# Patient Record
Sex: FEMALE | Race: WHITE | NOT HISPANIC OR LATINO | Employment: OTHER | URBAN - METROPOLITAN AREA
[De-identification: names, ages, dates, MRNs, and addresses within clinical notes are randomized per-mention and may not be internally consistent; named-entity substitution may affect disease eponyms.]

---

## 2024-06-13 DIAGNOSIS — M79.671 FOOT PAIN, BILATERAL: Primary | ICD-10-CM

## 2024-06-13 DIAGNOSIS — M79.672 FOOT PAIN, BILATERAL: Primary | ICD-10-CM

## 2024-06-17 ENCOUNTER — HOSPITAL ENCOUNTER (OUTPATIENT)
Dept: RADIOLOGY | Facility: HOSPITAL | Age: 64
Discharge: HOME | End: 2024-06-17
Attending: PODIATRIST
Payer: COMMERCIAL

## 2024-06-17 ENCOUNTER — OFFICE VISIT (OUTPATIENT)
Dept: PODIATRY | Facility: CLINIC | Age: 64
End: 2024-06-17
Payer: COMMERCIAL

## 2024-06-17 DIAGNOSIS — M79.671 FOOT PAIN, BILATERAL: ICD-10-CM

## 2024-06-17 DIAGNOSIS — M79.672 FOOT PAIN, BILATERAL: ICD-10-CM

## 2024-06-17 DIAGNOSIS — M21.611 BUNION OF GREAT TOE OF RIGHT FOOT: Primary | ICD-10-CM

## 2024-06-17 DIAGNOSIS — M20.5X9 MEDIAL CROSSOVER TOE DEFORMITY: ICD-10-CM

## 2024-06-17 DIAGNOSIS — M20.41 HAMMERTOE OF SECOND TOE OF RIGHT FOOT: ICD-10-CM

## 2024-06-17 PROCEDURE — 73630 X-RAY EXAM OF FOOT: CPT | Mod: TC,50

## 2024-06-17 PROCEDURE — 1160F RVW MEDS BY RX/DR IN RCRD: CPT | Mod: CPTII,S$GLB,, | Performed by: PODIATRIST

## 2024-06-17 PROCEDURE — 99204 OFFICE O/P NEW MOD 45 MIN: CPT | Mod: S$GLB,,, | Performed by: PODIATRIST

## 2024-06-17 PROCEDURE — 1159F MED LIST DOCD IN RCRD: CPT | Mod: CPTII,S$GLB,, | Performed by: PODIATRIST

## 2024-06-17 PROCEDURE — 99999 PR PBB SHADOW E&M-EST. PATIENT-LVL IV: CPT | Mod: PBBFAC,,, | Performed by: PODIATRIST

## 2024-06-17 PROCEDURE — 4010F ACE/ARB THERAPY RXD/TAKEN: CPT | Mod: CPTII,S$GLB,, | Performed by: PODIATRIST

## 2024-06-17 RX ORDER — TIRZEPATIDE 2.5 MG/.5ML
2.5 INJECTION, SOLUTION SUBCUTANEOUS WEEKLY
COMMUNITY
Start: 2024-06-14

## 2024-06-17 NOTE — PROGRESS NOTES
Subjective:       Patient ID: Roxy Ac is a 63 y.o. female.    Chief Complaint: Foot Problem (Patient denies pain at present to left foot. Patient is nondiabetic. Patients states has a bunion and 2nd toe crosses over on great left toe)      HPI: Roxy Ac presents to the office today for evaluation bunion deformity present to the right great toe.  States this is causing pain and discomfort.  Patient also noticing medial deviation of the 2nd toe causing discomfort with ambulation.  She is sought treatment prior to a physician [place].   Has utilize multiple modalities of conservative therapy without significant improvement. Patient has had discomfort to the great toe for the past several months to years. Patient is indeed interested in surgical intervention and/or cure for alleviation of symptoms. Patient's Primary Care Provider is Vance Arreaga MD.     Review of patient's allergies indicates:  No Known Allergies    Past Medical History:   Diagnosis Date    Arthritis     Hypertension        Family History   Problem Relation Name Age of Onset    Diabetes Maternal Grandmother      Prostate cancer Father      Alzheimer's disease Father      Hypertension Father      Hypertension Mother      Prostate cancer Brother      Breast cancer Paternal Aunt  78       Social History     Socioeconomic History    Marital status:    Occupational History    Occupation: Retired   Tobacco Use    Smoking status: Never    Smokeless tobacco: Never   Substance and Sexual Activity    Alcohol use: Yes     Comment: OCC.    Drug use: Never    Sexual activity: Yes     Partners: Male     Birth control/protection: None     Comment:      Social Determinants of Health     Financial Resource Strain: Low Risk  (1/3/2022)    [facility], [facility] Its Subsidiaries and Affiliates    Overall  Financial Resource Strain (CARDIA)     Difficulty of Paying Living Expenses: Not hard at all   Food Insecurity: No Food Insecurity (1/3/2022)    [facility], [facility] and [facility]ates    Hunger Vital Sign     Worried About Running Out of Food in the Last Year: Never true     Ran Out of Food in the Last Year: Never true   Transportation Needs: No Transportation Needs (1/3/2022)    [facility], [facility]    PRAPARE - Transportation     Lack of Transportation (Medical): No     Lack of Transportation (Non-Medical): No   Physical Activity: Inactive (1/3/2022)    [facility], [facility] and [facility]    Exercise Vital Sign     Days of Exercise per Week: 0 days     Minutes of Exercise per Session: 0 min   Stress: No Stress Concern Present (1/3/2022)    [facility], [facility]    [employer] - Occupational Stress Questionnaire     Feeling of Stress : Not at all   Housing Stability: Low Risk  (1/3/2022)    [facility], [facility]    Housing Stability Vital Sign     Unable to Pay for Housing in the Last Year: No     Number of Places Lived in the Last Year: 1     In the last 12 months, was there a time when you did not have a  steady place to sleep or slept in a shelter (including now)?: No       Past Surgical History:   Procedure Laterality Date    KNEE SURGERY         Review of Systems      Objective:   There were no vitals taken for this visit.    US Breast Left Limited, Mammo Digital Diagnostic Bilat with Pablo  Narrative: Result:   Mammo Digital Diagnostic Bilat with Pablo  US Breast Left Limited     History:  Patient is 63 y.o. and is seen for diagnostic imaging.    Films Compared:  Prior images (if available) were compared.     Findings:  This procedure was performed using tomosynthesis. Computer-aided detection   was utilized in the interpretation of this examination.  The breasts have scattered areas of fibroglandular density.     Mammo Digital Diagnostic Bilat with Pablo  There is no evidence of suspicious masses, calcifications, or other   abnormal findings.    US Breast Left Limited  There is no evidence of suspicious masses or other abnormal findings.  Impression: Bilateral  Mammo Digital Diagnostic Bilat with Pablo  There is no mammographic evidence of malignancy.    Left  US Breast Left Limited  There is no sonographic evidence of malignancy.    BI-RADS Category:   Overall: 1 - Negative       Recommendation:  Routine screening mammogram in 1 year is recommended.      Physical Exam   LOWER EXTREMITY PHYSICAL EXAMINATION    VASCULAR: On the right and left foot, the dorsalis pedis pulse is 2/4 and the posterior tibial pulse is 2/4. Capillary refill time is less than 3 seconds. Hair growth is present on the dorsum of the foot and at the digits. Proximal to distal temperature is warm to warm.    ORTHOPEDIC (right):  Moderate bunion deformity present to the right 1st metatarsophalangeal joint.  Moderate-sized medial eminence noted.  There is no significant arthritis with range of motion in dorsiflexion or plantar flexion.  No decrease in motion.  There is a medial deviation of the 2nd toe at the metatarsophalangeal  joint.    DERMATOLOGY: Skin is supple, dry and intact. No ecchymosis is noted. No ulcerations are noted. Skin is supple and moist.     NEUROLOGY: Protective sensation is intact via 5.07 Montegut Luis monofilament. Proprioception is intact. Sensation to light touch is intact.     Assessment:     1. Bunion of great toe of right foot    2. Hammertoe of second toe of right foot    3. Medial crossover toe deformity          Plan:     Bunion of great toe of right foot  -     Case Request Operating Room: BUNIONECTOMY, LAPIDUS, CORRECTION, HAMMER TOE, OSTEOTOMY, 2nd METATARSAL BONE  -     Ambulatory referral/consult to Pre-Admit; Future; Expected date: 06/24/2024    Hammertoe of second toe of right foot  -     Case Request Operating Room: BUNIONECTOMY, LAPIDUS, CORRECTION, HAMMER TOE, OSTEOTOMY, 2nd METATARSAL BONE  -     Ambulatory referral/consult to Pre-Admit; Future; Expected date: 06/24/2024    Medial crossover toe deformity  -     Case Request Operating Room: BUNIONECTOMY, LAPIDUS, CORRECTION, HAMMER TOE, OSTEOTOMY, 2nd METATARSAL BONE  -     Ambulatory referral/consult to Pre-Admit; Future; Expected date: 06/24/2024        Thorough discussion is had with the patient today, concerning the diagnosis, its etiology, and the treatment algorithm at present.    X-rays taken today and interpreted and reviewed by myself with the patient the room.  Hallux abductovalgus deformity with intermetatarsal angle of 21°.  The hallux abductus angle is 12°.  Tibial sesamoid position is 6.  No significant arthritis or joint space narrowing of the 1st metatarsophalangeal joint.  Deviation of the 2nd toe medially with evidence of hammertoe deformity at the proximal interphalangeal joint..     Discuss conservative and further surgical intervention.  Discussed 1st metatarsal cuneiform joint derotational arthrodesis to correct the bunion deformity, hammertoe correction via proximal interphalangeal joint arthrodesis, and Weil osteotomy with  repair of the lateral collateral ligament of the 2nd MPJ.     Discuss surgical expectations.  Discussed the postoperative protocol.  Patient will be nonweightbearing for proximally 3 weeks followed by an additional 3-4 weeks of weight-bearing in a postoperative boot.  Discussed risk of DVT.  Recommend to utilize aspirin for DVT prophylaxis.  Patient be nonweightbearing with knee scooter crutches    The procedure of (first tarsometatarsal joint arthrodesis of the right foot, right 2nd metatarsal shortening osteotomy-Weil, 2nd hammertoe proximal interphalangeal joint arthrodesis) was thoroughly explained to the patient. Its necessity and/or purpose and the implications therein were outlined, including any pertinent advantages and/or disadvantages, and possible complications, if any. Possible complications include recurrence of pathology and/or deformity, infection (cellulitis, drainage, purulence, malodor, etc...), pain, numbness, neuritis, edema, burning, loss of function, need for further surgery, possible need for removal of any implanted hardware, soft tissue contracture and/or scarring, etc... No guarantees were given and/or implied. Post-operative expectations and weightbearing protocol is thoroughly explained the patient, who acknowledges understanding. Rx. is given for pre-operative labs and for medical clearance by [facility].      Future Appointments   Date Time Provider Department Center   2/10/2025  9:10 AM [facility]  BMD1 [facility] BMD [facility]   2/10/2025  9:40 AM [facility]  MAMMO1 SCREEN [facility] MAMMO [facility]   2/10/2025 10:40 AM Liseth Sampson MD [facility] OBGYN [facility]

## 2024-07-11 ENCOUNTER — TELEPHONE (OUTPATIENT)
Dept: PODIATRY | Facility: CLINIC | Age: 64
End: 2024-07-11
Payer: COMMERCIAL

## 2024-07-11 NOTE — TELEPHONE ENCOUNTER
Called the pt to let her know Dr Heath and his staff are out of clinic and will not be back until Monday, I will forward this to them and they will give her a call on Monday.             ----- Message from Feliberto Josue sent at 7/11/2024  1:37 PM CDT -----  Contact: Roxy Ramsey is requesting a call back regarding needing the orders put back in, so she can get her blood work rescheduled before her surgery. Please give her a call at [phone]

## 2024-07-24 ENCOUNTER — OFFICE VISIT (OUTPATIENT)
Dept: INTERNAL MEDICINE | Facility: CLINIC | Age: 64
End: 2024-07-24
Payer: COMMERCIAL

## 2024-07-24 ENCOUNTER — LAB VISIT (OUTPATIENT)
Dept: LAB | Facility: HOSPITAL | Age: 64
End: 2024-07-24
Attending: NURSE PRACTITIONER
Payer: COMMERCIAL

## 2024-07-24 ENCOUNTER — PATIENT MESSAGE (OUTPATIENT)
Dept: PREADMISSION TESTING | Facility: HOSPITAL | Age: 64
End: 2024-07-24
Payer: COMMERCIAL

## 2024-07-24 VITALS
TEMPERATURE: 97 F | DIASTOLIC BLOOD PRESSURE: 85 MMHG | OXYGEN SATURATION: 95 % | HEART RATE: 78 BPM | WEIGHT: 204.94 LBS | BODY MASS INDEX: 34.15 KG/M2 | SYSTOLIC BLOOD PRESSURE: 132 MMHG | RESPIRATION RATE: 18 BRPM | HEIGHT: 65 IN

## 2024-07-24 DIAGNOSIS — M20.41 HAMMERTOE OF SECOND TOE OF RIGHT FOOT: ICD-10-CM

## 2024-07-24 DIAGNOSIS — M21.611 BUNION OF GREAT TOE OF RIGHT FOOT: ICD-10-CM

## 2024-07-24 DIAGNOSIS — Z01.818 PREOP EXAMINATION: ICD-10-CM

## 2024-07-24 DIAGNOSIS — M20.41 HAMMERTOE OF RIGHT FOOT: ICD-10-CM

## 2024-07-24 DIAGNOSIS — I10 HYPERTENSION, UNSPECIFIED TYPE: ICD-10-CM

## 2024-07-24 DIAGNOSIS — E66.9 OBESITY (BMI 30-39.9): ICD-10-CM

## 2024-07-24 DIAGNOSIS — Z01.818 PREOP EXAMINATION: Primary | ICD-10-CM

## 2024-07-24 DIAGNOSIS — M20.5X9 MEDIAL CROSSOVER TOE DEFORMITY: ICD-10-CM

## 2024-07-24 DIAGNOSIS — M20.60 ACQUIRED DEFORMITY OF JOINT OF LESSER TOE: ICD-10-CM

## 2024-07-24 PROBLEM — M20.42 HAMMERTOE OF LEFT FOOT: Status: ACTIVE | Noted: 2024-07-24

## 2024-07-24 LAB
ALBUMIN SERPL BCP-MCNC: 3.8 G/DL (ref 3.5–5.2)
ALP SERPL-CCNC: 57 U/L (ref 55–135)
ALT SERPL W/O P-5'-P-CCNC: 20 U/L (ref 10–44)
ANION GAP SERPL CALC-SCNC: 10 MMOL/L (ref 8–16)
AST SERPL-CCNC: 21 U/L (ref 10–40)
BILIRUB SERPL-MCNC: 0.7 MG/DL (ref 0.1–1)
BUN SERPL-MCNC: 19 MG/DL (ref 8–23)
CALCIUM SERPL-MCNC: 10.1 MG/DL (ref 8.7–10.5)
CHLORIDE SERPL-SCNC: 109 MMOL/L (ref 95–110)
CO2 SERPL-SCNC: 24 MMOL/L (ref 23–29)
CREAT SERPL-MCNC: 0.9 MG/DL (ref 0.5–1.4)
ERYTHROCYTE [DISTWIDTH] IN BLOOD BY AUTOMATED COUNT: 13.3 % (ref 11.5–14.5)
EST. GFR  (NO RACE VARIABLE): >60 ML/MIN/1.73 M^2
GLUCOSE SERPL-MCNC: 95 MG/DL (ref 70–110)
HCT VFR BLD AUTO: 40.5 % (ref 37–48.5)
HGB BLD-MCNC: 13.8 G/DL (ref 12–16)
MCH RBC QN AUTO: 30.7 PG (ref 27–31)
MCHC RBC AUTO-ENTMCNC: 34.1 G/DL (ref 32–36)
MCV RBC AUTO: 90 FL (ref 82–98)
OHS QRS DURATION: 86 MS
OHS QTC CALCULATION: 393 MS
PLATELET # BLD AUTO: 198 K/UL (ref 150–450)
PMV BLD AUTO: 11.5 FL (ref 9.2–12.9)
POTASSIUM SERPL-SCNC: 4.3 MMOL/L (ref 3.5–5.1)
PROT SERPL-MCNC: 7.4 G/DL (ref 6–8.4)
RBC # BLD AUTO: 4.5 M/UL (ref 4–5.4)
SODIUM SERPL-SCNC: 143 MMOL/L (ref 136–145)
WBC # BLD AUTO: 6.27 K/UL (ref 3.9–12.7)

## 2024-07-24 PROCEDURE — 93010 ELECTROCARDIOGRAM REPORT: CPT | Mod: S$GLB,,, | Performed by: INTERNAL MEDICINE

## 2024-07-24 PROCEDURE — 85027 COMPLETE CBC AUTOMATED: CPT | Performed by: NURSE PRACTITIONER

## 2024-07-24 PROCEDURE — 99999 PR PBB SHADOW E&M-EST. PATIENT-LVL IV: CPT | Mod: PBBFAC,,,

## 2024-07-24 PROCEDURE — 93005 ELECTROCARDIOGRAM TRACING: CPT

## 2024-07-24 PROCEDURE — 99499 UNLISTED E&M SERVICE: CPT | Mod: S$GLB,,, | Performed by: ANESTHESIOLOGY

## 2024-07-24 PROCEDURE — 80053 COMPREHEN METABOLIC PANEL: CPT | Performed by: NURSE PRACTITIONER

## 2024-07-24 PROCEDURE — 36415 COLL VENOUS BLD VENIPUNCTURE: CPT | Performed by: NURSE PRACTITIONER

## 2024-07-24 NOTE — DISCHARGE INSTRUCTIONS
To confirm, your doctor has instructed you: Surgery is scheduled for 7/30/24.   Pre admit office will call the afternoon prior to surgery between 1PM and 3PM with arrival time.    Surgery will be at [facility],  The address is [address]. [place] [id number].      IMPORTANT INSTRUCTIONS!    Do not eat or drink after 12 midnight, including water. Do not smoke or use chewing tobacco after 12 midnight!  OK to brush teeth, but no gum, candy, or mints!      *Take only these medicines with a small swallow of water-morning of surgery*     None         ____ Stop taking all vitamins, herbal supplements, Aspirin, & NSAIDS (Ibuprofen, Advil, Aleve) 7 days prior to surgery, as these can thin your blood.    ____ Weight loss medication, such as Adipex and Phentermine, must be stopped 14 days prior to surgery, no exceptions!    *Diabetic Patients: If you take diabetic or weight loss medication, do NOT take morning of surgery unless instructed by Doctor. Metformin to be stopped 24 hrs prior to surgery. DO NOT take short-acting insulin the day of surgery. Only take HALF of your regular dose of long-acting insulin the night before surgery, unless instructed otherwise. Blood sugars will be checked in pre-op.   ~Ozempic/Mounjaro/Wegovy/Trulicity/Semaglutide injections must be stopped 7 days prior to surgery.     Please notify MD office if you develop an active infection, are prescribed antibiotics by someone other than the surgeon doing your surgery, or visit urgent care/ER.      Bathing Instructions:   The night before surgery and the morning prior to coming to the hospital:    - Shower & rinse your body as usual with anti-bacterial Soap (Dial or Lever 2000)   -Hibiclens (if indicated) use AFTER anti-bacterial soap; 1 packet PM/1 packet in AM on surgical site only   -Do not use hibiclens on your head, face, or genitals.    -Do not wash with anti-bacterial soap after you use the hibiclens.    -Do not shave  surgical site 5-7 days prior to surgery.    -Pubic hair 7 days prior to surgery (OBGYN/Urology only).       Additional Instructions:   __ No powder, lotions, creams, or body spray to skin   __ No deodorant if you are having: breast procedure, PORT, or upper shoulder surgery!   __ No nail polish or artificial nails       **SURGERY WILL BE CANCELLED IF ARTIFICIAL/NAIL POLISH IS PRESENT!!!**  __ Please remove all piercings and leave all jewelry at home.    **SURGERY WILL BE CANCELLED IF PIERCINGS ARE PRESENT!!!**    __ Dentures, Hearing Aids and Contact Lens need to be removed prior to the start of surgery.    __ Avoid Alcoholic beverages 3 days prior to surgery, as it can thin the blood, unless told otherwise by pre-admit department.  __ Females: may need to give a urine sample the morning of surgery;   **Please ask  for a specimen cup if you need to use the restroom prior to being called into pre-op.**  __ Males: Stop ED medications (Viagra, Cialis) 24 hrs prior to surgery.  __ Wear clean, loose-fitting clothing. Allow for dressings/bandages/surgical equipment   __ You must have transportation, and they MUST stay the entire time.   __  Bring photo ID and insurance information to [facility] Visitor/Ride Policy:   Only 1 adult allowed (over the age of 18) to accompany you and MUST STAY through the entire length of admission.     -Must have a ride home from a responsible adult that you know and trust.    -Medical Transport, Uber or Lyft can only be used if patient has a responsible adult to accompany them during ride home.    ~Your ride MUST STAY the entire time until you are discharged~        Post-Op Instructions: You will receive surgery post-op instructions by your Discharge Nurse prior to going home.   Surgical Site Infection:   Prevention of surgical site infections:   -Keep incisions clean and dry.   -Do not soak/submerge incisions in water until completely healed.   -Do not apply lotions,  powders, creams, or deodorants to site.   -Always make sure hands are cleaned with antibacterial soap/ alcohol-based  prior to touching the surgical site.       Signs and symptoms:               -Redness and pain around the area where you had surgery               -Drainage of cloudy fluid from your surgical wound               -Fever over 100.4 or chills     >>>Call Surgeon office/on-call Surgeon if you experience any of these signs & symptoms post-surgery @ [phone].    *If you are running late or have questions the morning of surgery before 7AM, please call the Pre-OP Department @ [phone].    *Please Call [facility] Pre-Admit Department for surgery instruction questions:  [phone]    *Payment questions:  [phone]    *Billing questions:  [phone]    no hx of rehab/detox

## 2024-07-24 NOTE — PROGRESS NOTES
Preoperative History and Physical  [facility]                                                                   Chief Complaint: Preoperative evaluation     History of Present Illness:      Roxy Ac is a 63 y.o. female with a PMHx of HTN, obesity, and bunions who presents to the office today for a preoperative consultation at the request of Dr. Heath who plans on performing a Lapidus bunionectomy on July 30.     Functional Status:      The patient is able to climb a flight of stairs. The patient is able to ambulate without difficulty. The patient's functional status is affected by the surgical problem. The patient's functional status is not affected by shortness of breath, chest pain, dyspnea on exertion and fatigue.      MET score greater than 4.    Patient Anesthesia History:      History of Malignant Hyperthermia: no  History of Pseudocholinesterase Deficiency: no  History PONV: no  History of difficult intubation: no  History of delayed emergence: no  History of high fever after anesthesia: no    Family Anesthesia History:      History of Malignant Hyperthermia: no    History of Pseudocholinesterase Deficiency: no    Past Medical History:      Past Medical History:   Diagnosis Date    Arthritis     Hyperlipidemia     Hypertension     Obese         Past Surgical History:      Past Surgical History:   Procedure Laterality Date    COLONOSCOPY      KNEE SURGERY          Social History:      Social History     Socioeconomic History    Marital status:    Occupational History    Occupation: Retired   Tobacco Use    Smoking status: Never    Smokeless tobacco: Never   Substance and Sexual Activity    Alcohol use: Yes     Comment: OCC.    Drug use: Never    Sexual activity: Yes     Partners: Male     Birth control/protection: None     Comment:      Social Determinants of Health     Financial Resource Strain: Low Risk  (7/23/2024)    Overall Financial  Resource Strain (CARDIA)     Difficulty of Paying Living Expenses: Not hard at all   Food Insecurity: No Food Insecurity (7/23/2024)    Hunger Vital Sign     Worried About Running Out of Food in the Last Year: Never true     Ran Out of Food in the Last Year: Never true   Transportation Needs: No Transportation Needs (1/3/2022)    Received from [facility] and [facility] Subsidi[facility] Affiliates, [facility]    PRAPARE - Transportation     Lack of Transportation (Medical): No     Lack of Transportation (Non-Medical): No   Physical Activity: Unknown (7/23/2024)    Exercise Vital Sign     Days of Exercise per Week: 0 days   Stress: No Stress Concern Present (7/23/2024)    [employer] - Occupational Stress Questionnaire     Feeling of Stress : Not at all   Housing Stability: Unknown (7/23/2024)    Housing Stability Vital Sign     Unable to Pay for Housing in the Last Year: No        Family History:      Family History   Problem Relation Name Age of Onset    Hypertension Mother      Liver disease Mother      Prostate cancer Father      Alzheimer's disease Father      Hypertension Father      Prostate cancer Brother      Prostate cancer Brother      Breast cancer Paternal Aunt  78    Diabetes Maternal Grandmother      No Known Problems Maternal Grandfather      Alzheimer's disease Paternal Grandmother      Heart attack Paternal Grandfather         Allergies:      Review of patient's allergies indicates:  No Known Allergies    Medications:      Current Outpatient Medications   Medication Sig    aspirin (ECOTRIN) 81 MG EC tablet Take 81 mg by mouth.    b complex vitamins capsule Take 1 capsule by mouth once daily.    ergocalciferol (VITAMIN D2) 50,000 unit Cap Take 50,000 Units by mouth every 7 days.    losartan (COZAAR) 100 MG tablet     rosuvastatin (CRESTOR) 5 MG tablet     ZYRTEC 10 mg  tablet Take 10 mg by mouth nightly as needed.    ZEPBOUND 2.5 mg/0.5 mL PnIj Inject 2.5 mg into the skin once a week. (Patient not taking: Reported on 7/24/2024)     No current facility-administered medications for this visit.       Vitals:      Vitals:    07/24/24 0826   BP: 132/85   Pulse: 78   Resp: 18   Temp: 97 °F (36.1 °C)       Review of Systems:        Constitutional: Negative for fever, chills, weight loss, malaise/fatigue and diaphoresis.   HENT: Negative for hearing loss, ear pain, nosebleeds, congestion, sore throat, neck pain, tinnitus and ear discharge.    Eyes: Negative for blurred vision, double vision, photophobia, pain, discharge and redness.   Respiratory: Negative for cough, hemoptysis, sputum production, shortness of breath, wheezing and stridor.    Cardiovascular: Negative for chest pain, palpitations, orthopnea, claudication, leg swelling and PND.   Gastrointestinal: Negative for heartburn, nausea, vomiting, abdominal pain, diarrhea, constipation, blood in stool and melena.   Genitourinary: Negative for dysuria, urgency, frequency, hematuria and flank pain.   Musculoskeletal: Negative for myalgias, back pain, and falls. Positive for right great toe pain associated with bunion. Positive for 2nd right toe crossing over great toe.  Skin: Negative for itching and rash.   Neurological: Negative for dizziness, tingling, tremors, sensory change, speech change, focal weakness, seizures, loss of consciousness, weakness and headaches.   Endo/Heme/Allergies: Negative for environmental allergies and polydipsia. Does not bruise/bleed easily.   Psychiatric/Behavioral: Negative for depression, suicidal ideas, hallucinations, memory loss and substance abuse. The patient is not nervous/anxious and does not have insomnia.    All 14 systems reviewed and negative except as noted above.    Physical Exam:      Constitutional: Appears well-developed, well-nourished and in no acute distress.  Patient is oriented to  "person, place, and time.   Head: Normocephalic and atraumatic. Mucous membranes moist.  Neck: Neck supple no mass.   Cardiovascular: Normal rate and regular rhythm.  S1 S2 appreciated by ascultation.  Pulmonary/Chest: Effort normal and clear to auscultation bilaterally. No respiratory distress.   Abdomen: Soft. Non-tender and non-distended. Bowel sounds are normal.   Neurological: Patient is alert and oriented to person, place and time. Moves all extremities.  Skin: Warm and dry. No lesions.  Extremities: No clubbing, cyanosis or edema.    Laboratory data:      Reviewed and noted in plan where applicable. Please see chart for full laboratory data.    @[id number](cpk,cpkmb,troponini,mb)@ @ZOSHBIZUJ41(poctglucose)@     No results found for: "INR", "PROTIME"    No results found for: "WBC", "HGB", "HCT", "MCV", "PLT"    @TFTPHZKZS99(GLU,NA,K,Cl,CO2,BUN,Creatinine,Calcium,MG)@    Predictors of intubation difficulty:       Morbid obesity? yes    Anatomically abnormal facies? no   Prominent incisors? no   Receding mandible? no   Short, thick neck? yes    Neck range of motion: normal   Dentition: No chipped, loose, or missing teeth.  Based on the Modified Mallampati, patient is a mallampati score: I (soft palate, uvula, fauces, and tonsillar pillars visible)    Cardiographics:      ECG: normal sinus rhythm.    Echocardiogram: not indicated.    Imaging:      Chest x-ray: not indicated.    Assessment and Plan:      Preop examination  - Patient presents today at the request of Dr. Heath who plans on performing a Lapidus bunionectomy on 7/30.    Known risk factors for perioperative complications: HTN  Morbid Obesity  Difficulty with intubation is not anticipated.    Cardiac Risk Estimation: Based on the Revised Cardiac Risk index, patient is a Class 1 risk with a 3.9% risk of a major cardiac event in a low risk procedure.    1.) Preoperative workup as follows: ECG, hemoglobin, hematocrit, electrolytes, creatinine, glucose, " liver function studies.  2.) Change in medication regimen before surgery: discontinue ASA 6 days before surgery, discontinue NSAIDs 5 days before surgery, hold Mounjaro for 7 days prior to surgery.  3.) Prophylaxis for cardiac events with perioperative beta-blockers: not indicated.  4.) Invasive hemodynamic monitoring perioperatively: not indicated.  5.) Deep vein thrombosis prophylaxis postoperatively: intermittent pneumatic compression boots and regimen to be chosen by surgical team.  6.) Surveillance for postoperative MI with ECG immediately postoperatively and on postoperati ve days 1 and 2 AND troponin levels 24 hours postoperatively and on day 4 or hospital discharge (whichever comes first): not indicated.  7.) Current medications which may produce withdrawal symptoms if withheld perioperatively: None.  8.) Other measures:  None.      Bunion of great toe of right foot  - To undergo surgery.  - See plan as per above.    Hammertoe of right foot  - To undergo surgery.  - See plan as per above.    HTN (hypertension)  - BP well controlled.  - Continue Losartan qhs.    Obesity (BMI 30-39.9)  - Patient currently on Mounjaro, with last dose taken on 7/15.  She is aware of the need to hold for at least 7 days prior to surgery, and 7 days postoperatively.          Electronically signed by Shama Domingo DNP, ACNP on 7/24/2024 at 8:04 AM.

## 2024-07-24 NOTE — ASSESSMENT & PLAN NOTE
- Patient presents today at the request of Dr. Heath who plans on performing a Lapidus bunionectomy on 7/30.    Known risk factors for perioperative complications: HTN  Morbid Obesity  Difficulty with intubation is not anticipated.    Cardiac Risk Estimation: Based on the Revised Cardiac Risk index, patient is a Class 1 risk with a 3.9% risk of a major cardiac event in a low risk procedure.    1.) Preoperative workup as follows: ECG, hemoglobin, hematocrit, electrolytes, creatinine, glucose, liver function studies.  2.) Change in medication regimen before surgery: discontinue ASA 6 days before surgery, discontinue NSAIDs 5 days before surgery, hold Mounjaro for 7 days prior to surgery.  3.) Prophylaxis for cardiac events with perioperative beta-blockers: not indicated.  4.) Invasive hemodynamic monitoring perioperatively: not indicated.  5.) Deep vein thrombosis prophylaxis postoperatively: intermittent pneumatic compression boots and regimen to be chosen by surgical team.  6.) Surveillance for postoperative MI with ECG immediately postoperatively and on postoperati ve days 1 and 2 AND troponin levels 24 hours postoperatively and on day 4 or hospital discharge (whichever comes first): not indicated.  7.) Current medications which may produce withdrawal symptoms if withheld perioperatively: None.  8.) Other measures:  None.

## 2024-07-24 NOTE — ASSESSMENT & PLAN NOTE
- Patient currently on Mounjaro, with last dose taken on 7/15.  She is aware of the need to hold for at least 7 days prior to surgery, and 7 days postoperatively.

## 2024-07-29 ENCOUNTER — PATIENT MESSAGE (OUTPATIENT)
Dept: RESPIRATORY THERAPY | Facility: HOSPITAL | Age: 64
End: 2024-07-29
Payer: COMMERCIAL

## 2024-07-29 ENCOUNTER — ANESTHESIA EVENT (OUTPATIENT)
Dept: SURGERY | Facility: HOSPITAL | Age: 64
End: 2024-07-29
Payer: COMMERCIAL

## 2024-07-29 NOTE — ANESTHESIA PREPROCEDURE EVALUATION
07/29/2024  Roxy Ac is a 63 y.o., female.    Past Medical History:   Diagnosis Date    Arthritis     Hyperlipidemia     Hypertension     Obese      Past Surgical History:   Procedure Laterality Date    COLONOSCOPY      KNEE SURGERY         Pre-op Assessment    I have reviewed the Patient Summary Reports.    I have reviewed the NPO Status.   I have reviewed the Medications.     Review of Systems  Anesthesia Hx:  No problems with previous Anesthesia   History of prior surgery of interest to airway management or planning:  Previous anesthesia: General        Denies Family Hx of Anesthesia complications.    Denies Personal Hx of Anesthesia complications.                    Social:  Non-Smoker       Hematology/Oncology:  Hematology Normal                                     Cardiovascular:     Hypertension           hyperlipidemia                             Pulmonary:  Pulmonary Normal                       Renal/:  Renal/ Normal                 Hepatic/GI:  Hepatic/GI Normal                 Musculoskeletal:  Arthritis               Neurological:  Neurology Normal                                      Endocrine:        Obesity / BMI > 30  Psych:  Psychiatric Normal                    Physical Exam  General: Well nourished    Airway:  Mallampati: II   Mouth Opening: Normal  TM Distance: 4 - 6 cm  Tongue: Normal  Neck ROM: Normal ROM    Dental:  Intact        Anesthesia Plan  Type of Anesthesia, risks & benefits discussed:    Anesthesia Type: Gen ETT, Gen Supraglottic Airway  Intra-op Monitoring Plan: Standard ASA Monitors  Post Op Pain Control Plan: multimodal analgesia, IV/PO Opioids PRN and peripheral nerve block  Induction:  IV  Informed Consent: Informed consent signed with the Patient and all parties understand the risks and agree with anesthesia plan.  All questions answered. Patient consented to  blood products? No  ASA Score: 2  Day of Surgery Review of History & Physical: H&P Update referred to the surgeon/provider.    Ready For Surgery From Anesthesia Perspective.     .

## 2024-07-30 ENCOUNTER — HOSPITAL ENCOUNTER (OUTPATIENT)
Facility: HOSPITAL | Age: 64
Discharge: HOME | End: 2024-07-30
Attending: PODIATRIST | Admitting: PODIATRIST
Payer: COMMERCIAL

## 2024-07-30 ENCOUNTER — ANESTHESIA (OUTPATIENT)
Dept: SURGERY | Facility: HOSPITAL | Age: 64
End: 2024-07-30
Payer: COMMERCIAL

## 2024-07-30 ENCOUNTER — TELEPHONE (OUTPATIENT)
Dept: PODIATRY | Facility: CLINIC | Age: 64
End: 2024-07-30
Payer: COMMERCIAL

## 2024-07-30 ENCOUNTER — HOSPITAL ENCOUNTER (OUTPATIENT)
Dept: RADIOLOGY | Facility: HOSPITAL | Age: 64
Discharge: HOME | End: 2024-07-30
Attending: PODIATRIST | Admitting: PODIATRIST
Payer: COMMERCIAL

## 2024-07-30 DIAGNOSIS — M20.41 HAMMERTOE OF SECOND TOE OF RIGHT FOOT: ICD-10-CM

## 2024-07-30 DIAGNOSIS — M21.611 BUNION OF GREAT TOE OF RIGHT FOOT: Primary | ICD-10-CM

## 2024-07-30 DIAGNOSIS — M20.60 ACQUIRED DEFORMITY OF JOINT OF LESSER TOE: ICD-10-CM

## 2024-07-30 DIAGNOSIS — M20.5X9 MEDIAL CROSSOVER TOE DEFORMITY: ICD-10-CM

## 2024-07-30 PROCEDURE — 71000033 HC RECOVERY, INTIAL HOUR: Performed by: PODIATRIST

## 2024-07-30 PROCEDURE — 25000003 PHARM REV CODE 250: Performed by: ANESTHESIOLOGY

## 2024-07-30 PROCEDURE — 25000003 PHARM REV CODE 250: Performed by: NURSE ANESTHETIST, CERTIFIED REGISTERED

## 2024-07-30 PROCEDURE — 63600175 PHARM REV CODE 636 W HCPCS: Performed by: ANESTHESIOLOGY

## 2024-07-30 PROCEDURE — 28299 COR HLX VLGS DOUBLE OSTEOT: CPT | Mod: RT,,, | Performed by: PODIATRIST

## 2024-07-30 PROCEDURE — 27200651 HC AIRWAY, LMA: Performed by: ANESTHESIOLOGY

## 2024-07-30 PROCEDURE — 37000009 HC ANESTHESIA EA ADD 15 MINS: Performed by: PODIATRIST

## 2024-07-30 PROCEDURE — 28308 INCISION OF METATARSAL: CPT | Mod: 51,T7,, | Performed by: PODIATRIST

## 2024-07-30 PROCEDURE — 63600175 PHARM REV CODE 636 W HCPCS: Performed by: NURSE ANESTHETIST, CERTIFIED REGISTERED

## 2024-07-30 PROCEDURE — 73620 X-RAY EXAM OF FOOT: CPT | Mod: 26,RT,, | Performed by: RADIOLOGY

## 2024-07-30 PROCEDURE — 28285 REPAIR OF HAMMERTOE: CPT | Mod: 59,51,T7, | Performed by: PODIATRIST

## 2024-07-30 PROCEDURE — 73620 X-RAY EXAM OF FOOT: CPT | Mod: TC,RT

## 2024-07-30 PROCEDURE — 71000015 HC POSTOP RECOV 1ST HR: Performed by: PODIATRIST

## 2024-07-30 PROCEDURE — 28270 RELEASE OF FOOT CONTRACTURE: CPT | Mod: 59,51,T7, | Performed by: PODIATRIST

## 2024-07-30 PROCEDURE — 27201423 OPTIME MED/SURG SUP & DEVICES STERILE SUPPLY: Performed by: PODIATRIST

## 2024-07-30 PROCEDURE — 36000709 HC OR TIME LEV III EA ADD 15 MIN: Performed by: PODIATRIST

## 2024-07-30 PROCEDURE — 37000008 HC ANESTHESIA 1ST 15 MINUTES: Performed by: PODIATRIST

## 2024-07-30 PROCEDURE — 36000708 HC OR TIME LEV III 1ST 15 MIN: Performed by: PODIATRIST

## 2024-07-30 PROCEDURE — C1713 ANCHOR/SCREW BN/BN,TIS/BN: HCPCS | Performed by: PODIATRIST

## 2024-07-30 PROCEDURE — 64445 NJX AA&/STRD SCIATIC NRV IMG: CPT | Performed by: ANESTHESIOLOGY

## 2024-07-30 PROCEDURE — 64450 NJX AA&/STRD OTHER PN/BRANCH: CPT | Performed by: PODIATRIST

## 2024-07-30 PROCEDURE — C1769 GUIDE WIRE: HCPCS | Performed by: PODIATRIST

## 2024-07-30 RX ORDER — FENTANYL CITRATE 50 UG/ML
INJECTION, SOLUTION INTRAMUSCULAR; INTRAVENOUS
Status: DISCONTINUED | OUTPATIENT
Start: 2024-07-30 | End: 2024-07-30

## 2024-07-30 RX ORDER — BUPIVACAINE HYDROCHLORIDE 5 MG/ML
INJECTION, SOLUTION EPIDURAL; INTRACAUDAL
Status: DISCONTINUED
Start: 2024-07-30 | End: 2024-07-30 | Stop reason: WASHOUT

## 2024-07-30 RX ORDER — SODIUM CHLORIDE, SODIUM LACTATE, POTASSIUM CHLORIDE, CALCIUM CHLORIDE 600; 310; 30; 20 MG/100ML; MG/100ML; MG/100ML; MG/100ML
INJECTION, SOLUTION INTRAVENOUS CONTINUOUS PRN
Status: DISCONTINUED | OUTPATIENT
Start: 2024-07-30 | End: 2024-07-30

## 2024-07-30 RX ORDER — OXYCODONE AND ACETAMINOPHEN 5; 325 MG/1; MG/1
1 TABLET ORAL
Status: DISCONTINUED | OUTPATIENT
Start: 2024-07-30 | End: 2024-07-30 | Stop reason: HOSPADM

## 2024-07-30 RX ORDER — LIDOCAINE HYDROCHLORIDE 20 MG/ML
INJECTION, SOLUTION EPIDURAL; INFILTRATION; INTRACAUDAL; PERINEURAL
Status: COMPLETED | OUTPATIENT
Start: 2024-07-30 | End: 2024-07-30

## 2024-07-30 RX ORDER — SODIUM CHLORIDE, SODIUM LACTATE, POTASSIUM CHLORIDE, CALCIUM CHLORIDE 600; 310; 30; 20 MG/100ML; MG/100ML; MG/100ML; MG/100ML
INJECTION, SOLUTION INTRAVENOUS CONTINUOUS
Status: DISCONTINUED | OUTPATIENT
Start: 2024-07-30 | End: 2024-07-30 | Stop reason: HOSPADM

## 2024-07-30 RX ORDER — MEPERIDINE HYDROCHLORIDE 25 MG/ML
12.5 INJECTION INTRAMUSCULAR; INTRAVENOUS; SUBCUTANEOUS ONCE AS NEEDED
Status: DISCONTINUED | OUTPATIENT
Start: 2024-07-30 | End: 2024-07-30 | Stop reason: HOSPADM

## 2024-07-30 RX ORDER — ROPIVACAINE HYDROCHLORIDE 5 MG/ML
INJECTION, SOLUTION EPIDURAL; INFILTRATION; PERINEURAL
Status: COMPLETED | OUTPATIENT
Start: 2024-07-30 | End: 2024-07-30

## 2024-07-30 RX ORDER — LIDOCAINE HYDROCHLORIDE 20 MG/ML
INJECTION INTRAVENOUS
Status: DISCONTINUED | OUTPATIENT
Start: 2024-07-30 | End: 2024-07-30

## 2024-07-30 RX ORDER — EPHEDRINE SULFATE 50 MG/ML
INJECTION, SOLUTION INTRAVENOUS
Status: DISCONTINUED | OUTPATIENT
Start: 2024-07-30 | End: 2024-07-30

## 2024-07-30 RX ORDER — GLUCAGON 1 MG
1 KIT INJECTION
Status: DISCONTINUED | OUTPATIENT
Start: 2024-07-30 | End: 2024-07-30 | Stop reason: HOSPADM

## 2024-07-30 RX ORDER — PROPOFOL 10 MG/ML
INJECTION, EMULSION INTRAVENOUS
Status: DISCONTINUED | OUTPATIENT
Start: 2024-07-30 | End: 2024-07-30

## 2024-07-30 RX ORDER — MIDAZOLAM HYDROCHLORIDE 1 MG/ML
INJECTION INTRAMUSCULAR; INTRAVENOUS
Status: DISCONTINUED | OUTPATIENT
Start: 2024-07-30 | End: 2024-07-30

## 2024-07-30 RX ORDER — PHENYLEPHRINE HYDROCHLORIDE 10 MG/ML
INJECTION INTRAVENOUS
Status: DISCONTINUED | OUTPATIENT
Start: 2024-07-30 | End: 2024-07-30

## 2024-07-30 RX ORDER — ASPIRIN 325 MG
325 TABLET ORAL DAILY
Start: 2024-07-30 | End: 2024-09-28

## 2024-07-30 RX ORDER — LIDOCAINE HYDROCHLORIDE 10 MG/ML
INJECTION, SOLUTION EPIDURAL; INFILTRATION; INTRACAUDAL; PERINEURAL
Status: DISCONTINUED
Start: 2024-07-30 | End: 2024-07-30 | Stop reason: WASHOUT

## 2024-07-30 RX ORDER — SUCCINYLCHOLINE CHLORIDE 20 MG/ML
INJECTION INTRAMUSCULAR; INTRAVENOUS
Status: DISCONTINUED | OUTPATIENT
Start: 2024-07-30 | End: 2024-07-30

## 2024-07-30 RX ORDER — CEFAZOLIN SODIUM 1 G/3ML
INJECTION, POWDER, FOR SOLUTION INTRAMUSCULAR; INTRAVENOUS
Status: DISCONTINUED | OUTPATIENT
Start: 2024-07-30 | End: 2024-07-30

## 2024-07-30 RX ORDER — FENTANYL CITRATE 50 UG/ML
25 INJECTION, SOLUTION INTRAMUSCULAR; INTRAVENOUS EVERY 5 MIN PRN
Status: DISCONTINUED | OUTPATIENT
Start: 2024-07-30 | End: 2024-07-30 | Stop reason: HOSPADM

## 2024-07-30 RX ORDER — AMOXICILLIN 250 MG
1 CAPSULE ORAL DAILY
Qty: 20 TABLET | Refills: 0 | Status: SHIPPED | OUTPATIENT
Start: 2024-07-30

## 2024-07-30 RX ORDER — DEXAMETHASONE SODIUM PHOSPHATE 4 MG/ML
INJECTION, SOLUTION INTRA-ARTICULAR; INTRALESIONAL; INTRAMUSCULAR; INTRAVENOUS; SOFT TISSUE
Status: DISCONTINUED | OUTPATIENT
Start: 2024-07-30 | End: 2024-07-30

## 2024-07-30 RX ORDER — ONDANSETRON HYDROCHLORIDE 2 MG/ML
INJECTION, SOLUTION INTRAVENOUS
Status: DISCONTINUED | OUTPATIENT
Start: 2024-07-30 | End: 2024-07-30

## 2024-07-30 RX ORDER — OXYCODONE AND ACETAMINOPHEN 10; 325 MG/1; MG/1
1 TABLET ORAL EVERY 4 HOURS PRN
Qty: 28 TABLET | Refills: 0 | Status: SHIPPED | OUTPATIENT
Start: 2024-07-30

## 2024-07-30 RX ORDER — ONDANSETRON HYDROCHLORIDE 2 MG/ML
4 INJECTION, SOLUTION INTRAVENOUS DAILY PRN
Status: DISCONTINUED | OUTPATIENT
Start: 2024-07-30 | End: 2024-07-30 | Stop reason: HOSPADM

## 2024-07-30 RX ORDER — DEXMEDETOMIDINE HYDROCHLORIDE 100 UG/ML
INJECTION, SOLUTION INTRAVENOUS
Status: DISCONTINUED | OUTPATIENT
Start: 2024-07-30 | End: 2024-07-30

## 2024-07-30 RX ADMIN — LIDOCAINE HYDROCHLORIDE 50 MG: 20 INJECTION INTRAVENOUS at 09:07

## 2024-07-30 RX ADMIN — CEFAZOLIN 2 G: 330 INJECTION, POWDER, FOR SOLUTION INTRAMUSCULAR; INTRAVENOUS at 09:07

## 2024-07-30 RX ADMIN — PHENYLEPHRINE HYDROCHLORIDE 100 MCG: 10 INJECTION INTRAVENOUS at 10:07

## 2024-07-30 RX ADMIN — SODIUM CHLORIDE, POTASSIUM CHLORIDE, SODIUM LACTATE AND CALCIUM CHLORIDE: 600; 310; 30; 20 INJECTION, SOLUTION INTRAVENOUS at 09:07

## 2024-07-30 RX ADMIN — MIDAZOLAM 2 MG: 1 INJECTION INTRAMUSCULAR; INTRAVENOUS at 09:07

## 2024-07-30 RX ADMIN — PROPOFOL 160 MG: 10 INJECTION, EMULSION INTRAVENOUS at 09:07

## 2024-07-30 RX ADMIN — ROPIVACAINE HYDROCHLORIDE 20 ML: 5 INJECTION, SOLUTION EPIDURAL; INFILTRATION; PERINEURAL at 09:07

## 2024-07-30 RX ADMIN — EPHEDRINE SULFATE 10 MG: 50 INJECTION INTRAVENOUS at 10:07

## 2024-07-30 RX ADMIN — LIDOCAINE HYDROCHLORIDE 5 ML: 20 INJECTION, SOLUTION EPIDURAL; INFILTRATION; INTRACAUDAL; PERINEURAL at 09:07

## 2024-07-30 RX ADMIN — DEXAMETHASONE SODIUM PHOSPHATE 8 MG: 4 INJECTION, SOLUTION INTRA-ARTICULAR; INTRALESIONAL; INTRAMUSCULAR; INTRAVENOUS; SOFT TISSUE at 10:07

## 2024-07-30 RX ADMIN — SUCCINYLCHOLINE CHLORIDE 120 MG: 20 INJECTION, SOLUTION INTRAMUSCULAR; INTRAVENOUS; PARENTERAL at 09:07

## 2024-07-30 RX ADMIN — DEXMEDETOMIDINE HYDROCHLORIDE 4 MCG: 100 INJECTION, SOLUTION INTRAVENOUS at 11:07

## 2024-07-30 RX ADMIN — ONDANSETRON 4 MG: 2 INJECTION INTRAMUSCULAR; INTRAVENOUS at 11:07

## 2024-07-30 RX ADMIN — PROPOFOL 100 MG: 10 INJECTION, EMULSION INTRAVENOUS at 09:07

## 2024-07-30 RX ADMIN — FENTANYL CITRATE 50 MCG: 50 INJECTION, SOLUTION INTRAMUSCULAR; INTRAVENOUS at 09:07

## 2024-07-30 NOTE — TELEPHONE ENCOUNTER
LVM informing patient appt is at 1:00pm today. If she arrives early she will not be able to be seen until 12:30 due to clinic closed for am surgery.  ----- Message from Nitza Hart sent at 7/30/2024  7:44 AM CDT -----  Contact: paty Sanchez is calling in regards to her being in traffic but will be in 10 minutes        Thanks  GLADYS

## 2024-07-30 NOTE — ANESTHESIA PROCEDURE NOTES
Intubation    Date/Time: 7/30/2024 9:45 AM    Performed by: Robyn Garcia CRNA  Authorized by: Christofer Moe MD    Intubation:     Induction:  Intravenous    Intubated:  Postinduction    Mask Ventilation:  Not attempted    Attempts:  1    Attempted By:  CRNA    Method of Intubation:  Video laryngoscopy    Blade:  Soto 3    Laryngeal View Grade: Grade I - full view of cords      Difficult Airway Encountered?: No      Complications:  Reflux of gastric contents - no apparent aspiration (gastric contents noted after LMA placed, No gastric contents noted in airway during intubation)    Airway Device:  Oral endotracheal tube    Airway Device Size:  7.0    Style/Cuff Inflation:  Cuffed    Inflation Amount (mL):  6    Tube secured:  22    Secured at:  The lips    Placement Verified By:  Capnometry    Complicating Factors:  None    Findings Post-Intubation:  BS equal bilateral and atraumatic/condition of teeth unchanged

## 2024-07-30 NOTE — ANESTHESIA PROCEDURE NOTES
Peripheral Block    Patient location during procedure: pre-op   Block not for primary anesthetic.  Reason for block: at surgeon's request and post-op pain management   Post-op Pain Location: right foot   Start time: 7/30/2024 9:24 AM  Timeout: 7/30/2024 9:22 AM   End time: 7/30/2024 9:27 AM    Staffing  Authorizing Provider: Christofer Moe MD  Performing Provider: Christofer Moe MD    Staffing  Performed by: Christofer Moe MD  Authorized by: Christofer Moe MD    Preanesthetic Checklist  Completed: patient identified, IV checked, site marked, risks and benefits discussed, surgical consent, monitors and equipment checked, pre-op evaluation and timeout performed  Peripheral Block  Patient position: supine  Prep: ChloraPrep  Patient monitoring: heart rate, cardiac monitor, continuous pulse ox, continuous capnometry and frequent blood pressure checks  Block type: popliteal  Laterality: right  Injection technique: single shot  Needle  Needle type: Stimuplex   Needle gauge: 21 G  Needle length: 4 in  Needle localization: anatomical landmarks and ultrasound guidance   -ultrasound image captured on disc.  Assessment  Injection assessment: negative aspiration, negative parasthesia and local visualized surrounding nerve  Paresthesia pain: none  Heart rate change: no  Slow fractionated injection: yes  Pain Tolerance: comfortable throughout block  Medications:    Medications: ropivacaine (NAROPIN) injection 0.5% - Perineural   20 mL - 7/30/2024 9:24:00 AM  lidocaine (PF) 20 mg/mL (2%) injection - Other   5 mL - 7/30/2024 9:24:00 AM    Additional Notes  VSS.  [facility] RN monitoring vitals throughout procedure.  Patient tolerated procedure well.

## 2024-07-30 NOTE — ANESTHESIA POSTPROCEDURE EVALUATION
Anesthesia Post Evaluation    Patient: Roxy Ac    Procedure(s) Performed: Procedure(s) (LRB):  BUNIONECTOMY, LAPIDUS (Right)  CORRECTION, HAMMER TOE (Right)  OSTEOTOMY, 2nd METATARSAL BONE (Right)  LENGTHENING, TENDON (Right)    Final Anesthesia Type: general      Patient location during evaluation: PACU  Patient participation: Yes- Able to Participate  Level of consciousness: awake  Post-procedure vital signs: reviewed and stable  Pain management: adequate  Airway patency: patent    PONV status at discharge: No PONV  Anesthetic complications: no      Cardiovascular status: stable  Respiratory status: unassisted  Hydration status: euvolemic  Follow-up not needed.              Vitals Value Taken Time   /80 07/30/24 1243   Temp 36.6 °C (97.9 °F) 07/30/24 1208   Pulse 82 07/30/24 1243   Resp 20 07/30/24 1243   SpO2 93 % 07/30/24 1243   Vitals shown include unfiled device data.      No case tracking events are documented in the log.      Pain/Jeanie Score: Jeanie Score: 8 (7/30/2024 12:35 PM)

## 2024-07-30 NOTE — TRANSFER OF CARE
Anesthesia Transfer of Care Note    Patient: Roxy Ac    Procedure(s) Performed: Procedure(s) (LRB):  BUNIONECTOMY, LAPIDUS (Right)  CORRECTION, HAMMER TOE (Right)  OSTEOTOMY, 2nd METATARSAL BONE (Right)    Patient location: PACU    Anesthesia Type: general    Transport from OR: Transported from OR on room air with adequate spontaneous ventilation    Post pain: adequate analgesia    Post assessment: no apparent anesthetic complications    Post vital signs: stable    Level of consciousness: sedated and responds to stimulation    Nausea/Vomiting: no nausea/vomiting    Complications: none    Transfer of care protocol was followed      Last vitals: Visit Vitals  BP (!) 164/84 (BP Location: Right arm, Patient Position: Lying)   Pulse 79   Temp 36.2 °C (97.1 °F) (Temporal)   Resp 12   Wt 92.5 kg (203 lb 14.8 oz)   SpO2 (!) 91%   Breastfeeding No   BMI 33.93 kg/m²

## 2024-07-31 VITALS
OXYGEN SATURATION: 95 % | DIASTOLIC BLOOD PRESSURE: 77 MMHG | WEIGHT: 203.94 LBS | TEMPERATURE: 98 F | BODY MASS INDEX: 33.93 KG/M2 | SYSTOLIC BLOOD PRESSURE: 148 MMHG | HEART RATE: 81 BPM | RESPIRATION RATE: 18 BRPM

## 2024-08-06 ENCOUNTER — OFFICE VISIT (OUTPATIENT)
Dept: PODIATRY | Facility: CLINIC | Age: 64
End: 2024-08-06
Payer: COMMERCIAL

## 2024-08-06 DIAGNOSIS — M20.41 HAMMERTOE OF SECOND TOE OF RIGHT FOOT: ICD-10-CM

## 2024-08-06 DIAGNOSIS — Z09 FOLLOW-UP EXAMINATION FOLLOWING SURGERY: Primary | ICD-10-CM

## 2024-08-06 DIAGNOSIS — M20.5X9 MEDIAL CROSSOVER TOE DEFORMITY: ICD-10-CM

## 2024-08-06 DIAGNOSIS — M21.611 BUNION OF GREAT TOE OF RIGHT FOOT: ICD-10-CM

## 2024-08-06 PROCEDURE — 99024 POSTOP FOLLOW-UP VISIT: CPT | Mod: S$GLB,,, | Performed by: PODIATRIST

## 2024-08-06 PROCEDURE — 4010F ACE/ARB THERAPY RXD/TAKEN: CPT | Mod: CPTII,S$GLB,, | Performed by: PODIATRIST

## 2024-08-06 PROCEDURE — 99999 PR PBB SHADOW E&M-EST. PATIENT-LVL III: CPT | Mod: PBBFAC,,, | Performed by: PODIATRIST

## 2024-08-06 PROCEDURE — 1159F MED LIST DOCD IN RCRD: CPT | Mod: CPTII,S$GLB,, | Performed by: PODIATRIST

## 2024-08-06 PROCEDURE — 1160F RVW MEDS BY RX/DR IN RCRD: CPT | Mod: CPTII,S$GLB,, | Performed by: PODIATRIST

## 2024-08-06 RX ORDER — TIRZEPATIDE 2.5 MG/.5ML
INJECTION, SOLUTION SUBCUTANEOUS
COMMUNITY
Start: 2024-06-25

## 2024-08-16 ENCOUNTER — OFFICE VISIT (OUTPATIENT)
Dept: PODIATRY | Facility: CLINIC | Age: 64
End: 2024-08-16
Payer: COMMERCIAL

## 2024-08-16 ENCOUNTER — HOSPITAL ENCOUNTER (OUTPATIENT)
Dept: RADIOLOGY | Facility: HOSPITAL | Age: 64
Discharge: HOME | End: 2024-08-16
Attending: PODIATRIST
Payer: COMMERCIAL

## 2024-08-16 DIAGNOSIS — M21.611 BUNION OF GREAT TOE OF RIGHT FOOT: ICD-10-CM

## 2024-08-16 DIAGNOSIS — M20.5X9 MEDIAL CROSSOVER TOE DEFORMITY: ICD-10-CM

## 2024-08-16 DIAGNOSIS — Z09 FOLLOW-UP EXAMINATION FOLLOWING SURGERY: Primary | ICD-10-CM

## 2024-08-16 DIAGNOSIS — M20.41 HAMMERTOE OF SECOND TOE OF RIGHT FOOT: ICD-10-CM

## 2024-08-16 DIAGNOSIS — Z09 FOLLOW-UP EXAMINATION FOLLOWING SURGERY: ICD-10-CM

## 2024-08-16 PROCEDURE — 99999 PR PBB SHADOW E&M-EST. PATIENT-LVL III: CPT | Mod: PBBFAC,,, | Performed by: PODIATRIST

## 2024-08-16 PROCEDURE — 73630 X-RAY EXAM OF FOOT: CPT | Mod: 26,RT,, | Performed by: RADIOLOGY

## 2024-08-16 PROCEDURE — 73630 X-RAY EXAM OF FOOT: CPT | Mod: TC,RT

## 2024-08-16 NOTE — PROGRESS NOTES
Subjective:       Patient ID: Roxy Ac is a 63 y.o. female.    Chief Complaint: Follow-up (Patient denies pain at present to right bunionectomy. Patient is nondiabetic. )    HPI: Roxy Ac presents clinic status post first tarsometatarsal joint arthrodesis due to history of bunion deformity, Weil osteotomy, Cesar osteotomy of great toe, and 2nd toe proximal interphalangeal joint arthrodesis.  States having little to no pain postoperatively.  Has been compliant with weight-bearing specifications.  Continues take aspirin for DVT prophylaxis.  Denies ipsilateral calf pain.  Dressings have remained clean, dry, intact.  Presents to clinic today with  present.  Reports incident of almost falling resulting in placing some pressure to her heel.    Review of patient's allergies indicates:  No Known Allergies    Past Medical History:   Diagnosis Date    Arthritis     Hyperlipidemia     Hypertension     Obese        Family History   Problem Relation Name Age of Onset    Hypertension Mother      Liver disease Mother      Prostate cancer Father      Alzheimer's disease Father      Hypertension Father      Prostate cancer Brother      Prostate cancer Brother      Breast cancer Paternal Aunt  78    Diabetes Maternal Grandmother      No Known Problems Maternal Grandfather      Alzheimer's disease Paternal Grandmother      Heart attack Paternal Grandfather         Social History     Socioeconomic History    Marital status:    Occupational History    Occupation: Retired   Tobacco Use    Smoking status: Never    Smokeless tobacco: Never   Substance and Sexual Activity    Alcohol use: Yes     Comment: OCC.    Drug use: Never    Sexual activity: Yes     Partners: Male     Birth control/protection: None     Comment: [facility]     Social Determinants of Health     Financial Resource Strain: Low Risk  (7/23/2024)    Overall Financial Resource Strain (CARDIA)     Difficulty of Paying Living Expenses: Not hard at all    Food Insecurity: No Food Insecurity (7/23/2024)    Hunger Vital Sign     Worried About Running Out of Food in the Last Year: Never true     Ran Out of Food in the Last Year: Never true   Transportation Needs: No Transportation Needs (1/3/2022)    [facility]    PRAPARE - Transportation     Lack of Transportation (Medical): No     Lack of Transportation (Non-Medical): No   Physical Activity: Unknown (7/23/2024)    Exercise Vital Sign     Days of Exercise per Week: 0 days   Stress: No Stress Concern Present (7/23/2024)    [employer] - Occupational Stress Questionnaire     Feeling of Stress : Not at all   Housing Stability: Unknown (7/23/2024)    Housing Stability Vital Sign     Unable to Pay for Housing in the Last Year: No       Past Surgical History:   Procedure Laterality Date    COLONOSCOPY      CORRECTION OF HAMMER TOE Right 7/30/2024    Procedure: CORRECTION, HAMMER TOE;  Surgeon: Dotty Heath DPM;  Location: [facility] OR;  Service: Podiatry;  Laterality: Right;  2nd toe    KNEE SURGERY      LAPIDUS BUNIONECTOMY Right 7/30/2024    Procedure: BUNIONECTOMY, LAPIDUS;  Surgeon: Dotty Heath DPM;  Location: [facility] OR;  Service: Podiatry;  Laterality: Right;    LENGTHENING OF TENDON Right 7/30/2024    Procedure: LENGTHENING, TENDON;  Surgeon: Dotty Heath DPM;  Location: [facility] OR;  Service: Podiatry;  Laterality: Right;    OSTEOTOMY OF METATARSAL BONE Right 7/30/2024    Procedure: OSTEOTOMY, 2nd METATARSAL BONE;  Surgeon: Dotty Heath DPM;  Location: [facility] OR;  Service: Podiatry;  Laterality: Right;       Review of Systems       Objective:   There were no vitals taken for this visit.    X-Ray Foot 2 View Right  Narrative: EXAM: XR FOOT 2 VIEW RIGHT    CLINICAL HISTORY: Postoperative    COMPARISON:  06/17/2024    TECHNIQUE:  AP and lateral views of the right foot were performed.    FINDINGS: Interval postsurgical changes are visible in the head of the second metatarsal, proximal phalanx of the great toe and articulation of the base of the first metatarsal with the medial cuneiform bone for correction of a hallux valgus deformity.  A posterior splint is also in place.  Impression:  Postsurgical changes associated with correction of a hallux valgus deformity described above.    Finalized on: 7/30/2024 2:52 PM By:  Anant Esteban  [facility]# [id number]      2024-07-30 14:54:10.945    [facility]  X-Ray Foot 2 View Right  Narrative: EXAM: XR FOOT 2 VIEW RIGHT    CLINICAL HISTORY: Intraoperative    COMPARISON: 06/17/2024    TECHNIQUE:  Multiple intraoperative spot views were performed.    FINDINGS: Intraoperative views demonstrate surgical fusion of the base of the first metatarsal with the medial cuneiform bone and additional surgical changes in the proximal phalanx of the great toe and head of the second metatarsal.  Please see operative report for further information.  Impression:  Intraoperative views of surgery of the right foot described above.    Fluoroscopy time:  2:58    Finalized on: 7/30/2024 1:09 PM By:  Anant Esteban  [facility]# [id number]      2024-07-30 13:11:34.003    [facility]  SURG FL Surgery Fluoro Usage  See OP Notes for results.     IMPRESSION: See OP Notes for results.     This procedure was auto-finalized by: Virtual Radiologist       Physical Exam   LOWER EXTREMITY PHYSICAL EXAMINATION    Vascular: Capillary refill time is intact. Edema is mild.   The right dorsalis pedis pulse 2/4 and the right posterior tibial pulse 2/4.   Pedal hair growth intact    Neurological: Sensation to light touch is intact. Proprioception is intact. Sensation to pin prick is intact.  No numbness or tingling identified.    Musculoskeletal:  Rectus position of great toe.  Remains nonweightbearing.  Range of motion of the 1st  metatarsophalangeal joint is mostly pain-free    Dermatological:  Steri-Strips/sutures are intact. There is no signs of increased edema or erythema noted. The skin is well approximated and coapting.  No signs of postoperative infection.  Skin lines are present to the level affected lower extremity as result of decreased edema    Imaging:       X-Ray Foot Complete 3 view Bilateral    Narrative  EXAM: XR FOOT COMPLETE 3 VIEW BILATERAL    CLINICAL HISTORY: [Pain]    FINDINGS: No evidence of acute fracture or dislocation.  Normal mineralization.  Soft tissues are unremarkable.  Small to moderate bilateral heel spurs.  Mild scattered degenerative change greatest at the first MTP joint.    Impression  No acute findings.    Finalized on: 6/17/2024 9:30 PM By:  Isidro Alicea MD  [facility]# [id number]      2024-06-17 21:32:45.565    [facility]      No results found for this or any previous visit.       No results found for this or any previous visit.          Assessment:     1. Follow-up examination following surgery    2. Bunion of great toe of right foot    3. Hammertoe of second toe of right foot    4. Medial crossover toe deformity        Plan:     Follow-up examination following surgery    Bunion of great toe of right foot    Hammertoe of second toe of right foot    Medial crossover toe deformity        Thorough discussion is had with the patient today, concerning the diagnosis, its etiology, and the treatment algorithm at present.    Dressings were removed today without issue or complication.    Sutures were removed today to all incisions.  Skin is well healed and coapted.  Decreasing edema is present.    She is okay to shower, allow the foot to get wet, but would refrain from scrubbing over the next week.  Steri-Strips were applied.     Repeat x-rays were taken today and compared to preoperative imaging.  Intermetatarsal angle is now 8° from prior 21°.  Hallux abductus angle is 1° and prior 13°.  Tibial sesamoid position is normal  and rectus.  There is no elevatus.  Healing present in noted to the 1st metatarsal cuneiform joint.  Hardware in place without complications    Transition patient to partial to full weight-bearing in Cam boot today.  She will follow-up in four weeks    Continue DVT prophylaxis.  Encourage range of motion of the ankle joint to prevent calf atrophy.  Encourage range of motion of the 1st metatarsophalangeal joint    Continue to reinforce with the patient the importance of calling immediately if there is any noted complications or sudden changes to the patient's condition.  We discuss red flag risk factors of things to continue to monitor for.  Patient relates understanding as discussed    Future Appointments   Date Time Provider Department Center   9/13/2024  2:15 PM Dotty Heath DPM [facility] POD [place]   2/10/2025  9:10 AM [facility]  BMD1 [facility] BMD [facility]   2/10/2025  9:40 AM [facility]  MAMMO1 SCREEN [facility]MO [place] [facility]   2/10/2025 10:40 AM Liseth Sampson MD [facility] OBGYN [place] [facility]

## 2024-09-10 DIAGNOSIS — Z09 FOLLOW-UP EXAMINATION FOLLOWING SURGERY: ICD-10-CM

## 2024-09-10 DIAGNOSIS — M20.41 HAMMERTOE OF SECOND TOE OF RIGHT FOOT: ICD-10-CM

## 2024-09-10 DIAGNOSIS — M21.611 BUNION OF GREAT TOE OF RIGHT FOOT: Primary | ICD-10-CM

## 2024-09-13 ENCOUNTER — OFFICE VISIT (OUTPATIENT)
Dept: PODIATRY | Facility: CLINIC | Age: 64
End: 2024-09-13
Payer: COMMERCIAL

## 2024-09-13 ENCOUNTER — HOSPITAL ENCOUNTER (OUTPATIENT)
Dept: RADIOLOGY | Facility: HOSPITAL | Age: 64
Discharge: HOME | End: 2024-09-13
Attending: PODIATRIST
Payer: COMMERCIAL

## 2024-09-13 DIAGNOSIS — M21.611 BUNION OF GREAT TOE OF RIGHT FOOT: ICD-10-CM

## 2024-09-13 DIAGNOSIS — M20.5X9 MEDIAL CROSSOVER TOE DEFORMITY: ICD-10-CM

## 2024-09-13 DIAGNOSIS — Z09 FOLLOW-UP EXAMINATION FOLLOWING SURGERY: ICD-10-CM

## 2024-09-13 DIAGNOSIS — M20.41 HAMMERTOE OF SECOND TOE OF RIGHT FOOT: ICD-10-CM

## 2024-09-13 DIAGNOSIS — Z09 FOLLOW-UP EXAMINATION FOLLOWING SURGERY: Primary | ICD-10-CM

## 2024-09-13 PROCEDURE — 73630 X-RAY EXAM OF FOOT: CPT | Mod: TC,RT

## 2024-09-13 PROCEDURE — 99999 PR PBB SHADOW E&M-EST. PATIENT-LVL III: CPT | Mod: PBBFAC,,, | Performed by: PODIATRIST

## 2024-09-13 PROCEDURE — 73630 X-RAY EXAM OF FOOT: CPT | Mod: 26,RT,, | Performed by: RADIOLOGY

## 2024-09-13 NOTE — PROGRESS NOTES
Subjective:       Patient ID: Roxy Ac is a 63 y.o. female.    Chief Complaint: Post-op Evaluation (7.30.24 right bunionectomy and right second toe. Denies pain at present.  Patient ambulating without boot without difficulty.)    HPI: Roxy Ac presents clinic status post first tarsometatarsal joint arthrodesis due to history of bunion deformity, Weil osteotomy, Cesar osteotomy of great toe, and 2nd toe proximal interphalangeal joint arthrodesis.  States no pain currently but does admit to having some numbness and discomfort to the medial aspect of the TMTJ.  Does admit that she has been ambulating without the cam boot as previously discussed.   Denies ipsilateral calf pain.  Presents to clinic today with  present.      Review of patient's allergies indicates:  No Known Allergies    Past Medical History:   Diagnosis Date    Arthritis     Hyperlipidemia     Hypertension     Obese        Family History   Problem Relation Name Age of Onset    Hypertension Mother      Liver disease Mother      Prostate cancer Father      Alzheimer's disease Father      Hypertension Father      Prostate cancer Brother      Prostate cancer Brother      Breast cancer Paternal Aunt  78    Diabetes Maternal Grandmother      No Known Problems Maternal Grandfather      Alzheimer's disease Paternal Grandmother      Heart attack Paternal Grandfather         Social History     Socioeconomic History    Marital status:    Occupational History    Occupation: Retired   Tobacco Use    Smoking status: Never    Smokeless tobacco: Never   Substance and Sexual Activity    Alcohol use: Yes     Comment: OCC.    Drug use: Never    Sexual activity: Yes     Partners: Male     Birth control/protection: None     Comment:      Social Determinants of Health     Financial Resource Strain: Low Risk  (7/23/2024)    Overall Financial Resource Strain (CARDIA)     Difficulty of Paying Living Expenses: Not hard at all   Food Insecurity: No  Food Insecurity (7/23/2024)    Hunger Vital Sign     Worried About Running Out of Food in the Last Year: Never true     Ran Out of Food in the Last Year: Never true   Transportation Needs: No Transportation Needs (1/3/2022)    [facility]    PRAPARE - Transportation     Lack of Transportation (Medical): No     Lack of Transportation (Non-Medical): No   Physical Activity: Unknown (7/23/2024)    Exercise Vital Sign     Days of Exercise per Week: 0 days   Stress: No Stress Concern Present (7/23/2024)    [employer] [place] [employer] - Occupational Stress Questionnaire     Feeling of Stress : Not at all   Housing Stability: Unknown (7/23/2024)    Housing Stability Vital Sign     Unable to Pay for Housing in the Last Year: No       Past Surgical History:   Procedure Laterality Date    COLONOSCOPY      CORRECTION OF HAMMER TOE Right 7/30/2024    Procedure: CORRECTION, HAMMER TOE;  Surgeon: Dotty Heath DPM;  Location: [facility] OR;  Service: Podiatry;  Laterality: Right;  2nd toe    KNEE SURGERY      LAPIDUS BUNIONECTOMY Right 7/30/2024    Procedure: BUNIONECTOMY, LAPIDUS;  Surgeon: Dotty Heath DPM;  Location: [facility] OR;  Service: Podiatry;  Laterality: Right;    LENGTHENING OF TENDON Right 7/30/2024    Procedure: LENGTHENING, TENDON;  Surgeon: Dotty Heath DPM;  Location: [facility] OR;  Service: Podiatry;  Laterality: Right;    OSTEOTOMY OF METATARSAL BONE Right 7/30/2024    Procedure: OSTEOTOMY, 2nd METATARSAL BONE;  Surgeon: Dotty Heath DPM;  Location: [facility] OR;  Service: Podiatry;  Laterality: Right;       Review of Systems       Objective:   There were no vitals taken for this visit.    X-Ray Foot Complete Right  Narrative: EXAM:  XR FOOT COMPLETE 3 VIEW RIGHT    CLINICAL HISTORY:  Right foot pain    FINDINGS:  Comparison  07/30/2024.  3 views.  Postoperative changes are present with fusion at the first tarsal metatarsal joint.  Postoperative changes related to the first proximal phalanx and second metatarsal.  Stable alignment.  No acute fracture or dislocation.  Impression:  Stable postoperative changes.    Finalized on: 8/16/2024 8:21 PM By:  Esteban Mitchell MD  [facility]# [id number]      2024-08-16 20:23:13.949    [facility]       Physical Exam   LOWER EXTREMITY PHYSICAL EXAMINATION    Vascular: Capillary refill time is intact. Edema is mild.   The right dorsalis pedis pulse 2/4 and the right posterior tibial pulse 2/4.   Pedal hair growth intact    Neurological: Sensation to light touch is intact. Proprioception is intact. Sensation to pin prick is intact.      Musculoskeletal:  Rectus position of great toe.  Ambulating with a mildly antalgic gait. Range of motion of the 1st metatarsophalangeal joint is mostly pain-free    Dermatological:  The skin is well approximated and coapting.  No signs of postoperative infection.  Skin lines are present to the level affected lower extremity as result of decreased edema    Imaging:       X-Ray Foot Complete 3 view Bilateral    Narrative  EXAM: XR FOOT COMPLETE 3 VIEW BILATERAL    CLINICAL HISTORY: [Pain]    FINDINGS: No evidence of acute fracture or dislocation.  Normal mineralization.  Soft tissues are unremarkable.  Small to moderate bilateral heel spurs.  Mild scattered degenerative change greatest at the first MTP joint.    Impression  No acute findings.    Finalized on: 6/17/2024 9:30 PM By:  Isidro Alicea MD  [facility]# [id number]      2024-06-17 21:32:45.565    [facility]      No results found for this or any previous visit.       No results found for this or any previous visit.          Assessment:     1. Follow-up examination following surgery    2. Bunion of great toe of right foot    3. Hammertoe of second toe of right foot    4. Medial crossover toe deformity        Plan:     Follow-up examination following  surgery    Bunion of great toe of right foot    Hammertoe of second toe of right foot    Medial crossover toe deformity        Thorough discussion is had with the patient today, concerning the diagnosis, its etiology, and the treatment algorithm at present.    Continue to encourage patient to allow the foot to get wet in regular shower and bath.  Can continue to soak and scrub incisions.  Following bath/shower, encourage emollient such as Aquaphor or Vaseline has foot appears to be dry in nature.  Steri-Strips were applied.     New x-rays where taken to show healing at the 1st TMTJ, proximal phalanx, and 2nd metatarsal neck.  No evidence of hardware complications.    Patient okay to ambulate in regular shoe gear but encourage her to decrease her activity as this is likely leading to some pain due to inflammatory response and lower extremity dependent position.    Okay to DC DVT prophylaxis.  Continue with range of motion exercise of the 1st metatarsophalangeal joint  Consider compression socks    Follow-up in 6 weeks    Continue to reinforce with the patient the importance of calling immediately if there is any noted complications or sudden changes to the patient's condition.  We discuss red flag risk factors of things to continue to monitor for.  Patient relates understanding as discussed    Future Appointments   Date Time Provider Department Center   10/23/2024 11:00 AM Dotty Heath DPM [facility]   2/10/2025  9:10 AM [facility]  BMD1 [facility] BMD [place] [facility]   2/10/2025  9:40 AM [facility]  MAMMO1 SCREEN [facility] MAMMO [place] [facility]   2/10/2025 10:40 AM Liseth Sampson MD [facility] OBGYN [place] [facility]

## 2024-10-07 DIAGNOSIS — Z09 FOLLOW-UP EXAMINATION FOLLOWING SURGERY: Primary | ICD-10-CM

## 2024-10-23 ENCOUNTER — HOSPITAL ENCOUNTER (OUTPATIENT)
Dept: RADIOLOGY | Facility: HOSPITAL | Age: 64
Discharge: HOME | End: 2024-10-23
Attending: PODIATRIST
Payer: COMMERCIAL

## 2024-10-23 ENCOUNTER — OFFICE VISIT (OUTPATIENT)
Dept: PODIATRY | Facility: CLINIC | Age: 64
End: 2024-10-23
Payer: COMMERCIAL

## 2024-10-23 DIAGNOSIS — M20.5X9 MEDIAL CROSSOVER TOE DEFORMITY: ICD-10-CM

## 2024-10-23 DIAGNOSIS — Z09 FOLLOW-UP EXAMINATION FOLLOWING SURGERY: Primary | ICD-10-CM

## 2024-10-23 DIAGNOSIS — Z09 FOLLOW-UP EXAMINATION FOLLOWING SURGERY: ICD-10-CM

## 2024-10-23 DIAGNOSIS — M21.611 BUNION OF GREAT TOE OF RIGHT FOOT: ICD-10-CM

## 2024-10-23 DIAGNOSIS — M20.41 HAMMERTOE OF SECOND TOE OF RIGHT FOOT: ICD-10-CM

## 2024-10-23 PROCEDURE — 73630 X-RAY EXAM OF FOOT: CPT | Mod: TC,RT

## 2024-10-23 PROCEDURE — 3288F FALL RISK ASSESSMENT DOCD: CPT | Mod: CPTII,S$GLB,, | Performed by: PODIATRIST

## 2024-10-23 PROCEDURE — 99024 POSTOP FOLLOW-UP VISIT: CPT | Mod: S$GLB,,, | Performed by: PODIATRIST

## 2024-10-23 PROCEDURE — 1159F MED LIST DOCD IN RCRD: CPT | Mod: CPTII,S$GLB,, | Performed by: PODIATRIST

## 2024-10-23 PROCEDURE — 1101F PT FALLS ASSESS-DOCD LE1/YR: CPT | Mod: CPTII,S$GLB,, | Performed by: PODIATRIST

## 2024-10-23 PROCEDURE — 99999 PR PBB SHADOW E&M-EST. PATIENT-LVL II: CPT | Mod: PBBFAC,,, | Performed by: PODIATRIST

## 2024-10-23 PROCEDURE — 1160F RVW MEDS BY RX/DR IN RCRD: CPT | Mod: CPTII,S$GLB,, | Performed by: PODIATRIST

## 2024-10-23 PROCEDURE — 73630 X-RAY EXAM OF FOOT: CPT | Mod: 26,RT,, | Performed by: RADIOLOGY

## 2024-10-23 PROCEDURE — 4010F ACE/ARB THERAPY RXD/TAKEN: CPT | Mod: CPTII,S$GLB,, | Performed by: PODIATRIST

## 2024-10-23 NOTE — PROGRESS NOTES
Subjective:       Patient ID: Roxy Ac is a 64 y.o. female.    Chief Complaint: Post-op Evaluation (Post op: pt rates 2/10, pt is nondiabetic, states hobbling when trying tot walk. Tries to flex it maybe sore. Feels numb and there like a sharp pain from toes.  Swelling noted at present. )    HPI: Roxy Ac presents clinic status post first tarsometatarsal joint arthrodesis due to history of bunion deformity, Weil osteotomy, Cesar osteotomy of great toe, and 2nd toe proximal interphalangeal joint arthrodesis.  2/10 pain currently.  Recently returned from [place] with leg in dependent position during the car ride.  Reporting sensation changes to her foot with the initiation of walking.  Does complain of some numbness the medial aspect of the hallux.  Ambulating in regular shoes without limitations. Presents to clinic today with  present.      Review of patient's allergies indicates:  No Known Allergies    Past Medical History:   Diagnosis Date    Arthritis     Hyperlipidemia     Hypertension     Obese        Family History   Problem Relation Name Age of Onset    Hypertension Mother      Liver disease Mother      Prostate cancer Father      Alzheimer's disease Father      Hypertension Father      Prostate cancer Brother      Prostate cancer Brother      Breast cancer Paternal Aunt  78    Diabetes Maternal Grandmother      No Known Problems Maternal Grandfather      Alzheimer's disease Paternal Grandmother      Heart attack Paternal Grandfather         Social History     Socioeconomic History    Marital status:    Occupational History    Occupation: Retired   Tobacco Use    Smoking status: Never    Smokeless tobacco: Never   Substance and Sexual Activity    Alcohol use: Yes     Comment: OCC.    Drug use: Never    Sexual activity: Yes     Partners: Male     Birth control/protection: None     Comment:      Social Drivers of Health     Financial Resource Strain: Low Risk  (7/23/2024)    Overall  Financial Resource Strain (CARDIA)     Difficulty of Paying Living Expenses: Not hard at all   Food Insecurity: No Food Insecurity (7/23/2024)    Hunger Vital Sign     Worried About Running Out of Food in the Last Year: Never true     Ran Out of Food in the Last Year: Never true   Transportation Needs: No Transportation Needs (1/3/2022)    [facility]    PRAPARE - Transportation     Lack of Transportation (Medical): No     Lack of Transportation (Non-Medical): No   Physical Activity: Unknown (7/23/2024)    Exercise Vital Sign     Days of Exercise per Week: 0 days   Stress: No Stress Concern Present (7/23/2024)    [employer] [place] [employer] - Occupational Stress Questionnaire     Feeling of Stress : Not at all   Housing Stability: Unknown (7/23/2024)    Housing Stability Vital Sign     Unable to Pay for Housing in the Last Year: No       Past Surgical History:   Procedure Laterality Date    COLONOSCOPY      CORRECTION OF HAMMER TOE Right 7/30/2024    Procedure: CORRECTION, HAMMER TOE;  Surgeon: Dotty Heath DPM;  Location: [facility] OR;  Service: Podiatry;  Laterality: Right;  2nd toe    KNEE SURGERY      LAPIDUS BUNIONECTOMY Right 7/30/2024    Procedure: BUNIONECTOMY, LAPIDUS;  Surgeon: Dotty Heath DPM;  Location: [facility] OR;  Service: Podiatry;  Laterality: Right;    LENGTHENING OF TENDON Right 7/30/2024    Procedure: LENGTHENING, TENDON;  Surgeon: Dotty Heath DPM;  Location: [facility] OR;  Service: Podiatry;  Laterality: Right;    OSTEOTOMY OF METATARSAL BONE Right 7/30/2024    Procedure: OSTEOTOMY, 2nd METATARSAL BONE;  Surgeon: Dotty Heath DPM;  Location: [facility] OR;  Service: Podiatry;  Laterality: Right;       Review of Systems       Objective:   There were no vitals taken for this visit.    X-Ray Foot Complete 3  view Right  Narrative: EXAM: XR FOOT COMPLETE 3 VIEW RIGHT    CLINICAL HISTORY: Bunion    COMPARISON: 08/16/2024    TECHNIQUE:  3 views of the right foot were performed.    FINDINGS: There is stable appearance of postsurgical changes associated with correction of a hallux valgus deformity visible in the proximal phalanx of the great toe, head of the second metatarsal and articulation of the base of the first metatarsal with the medial cuneiform bone.  No fracture or dislocation is identified.  Soft tissue swelling and edema is suggested in the dorsum of the foot on the lateral view.  Impression: 1.  Stable appearance of postsurgical changes associated with correction of hallux valgus deformity compared to 08/16/2024.  2.  Suspect soft tissue swelling and edema in the dorsum of the foot.    Finalized on: 9/13/2024 4:46 PM By:  Anant Esteban  [facility]# [id number]      2024-09-13 16:[id number]    [facility]       Physical Exam   LOWER EXTREMITY PHYSICAL EXAMINATION    Vascular: Capillary refill time is intact. Edema is mild.   The right dorsalis pedis pulse 2/4 and the right posterior tibial pulse 2/4.   Pedal hair growth intact    Neurological: Sensation to light touch is intact. Proprioception is intact. Sensation to pin prick is intact.      Musculoskeletal:  Rectus position of great toe.  Ambulating with a mildly to minimally antalgic gait. Range of motion of the 1st metatarsophalangeal joint is mostly pain-free    Dermatological:  The skin is well approximated and coapting.  No signs of postoperative infection.  Skin lines are present to the level affected lower extremity as result of decreased edema    Imaging:       X-Ray Foot Complete 3 view Bilateral    Narrative  EXAM: XR FOOT COMPLETE 3 VIEW BILATERAL    CLINICAL HISTORY: [Pain]    FINDINGS: No evidence of acute fracture or dislocation.  Normal mineralization.  Soft tissues are unremarkable.  Small to moderate bilateral heel spurs.  Mild scattered degenerative change  greatest at the first MTP joint.    Impression  No acute findings.    Finalized on: 6/17/2024 9:30 PM By:  Isidro Alicea MD  [facility]# [id number]      2024-06-17 21:32:[id number]    [facility]      No results found for this or any previous visit.       No results found for this or any previous visit.          Assessment:     1. Follow-up examination following surgery    2. Bunion of great toe of right foot    3. Hammertoe of second toe of right foot    4. Medial crossover toe deformity        Plan:     Follow-up examination following surgery    Bunion of great toe of right foot    Hammertoe of second toe of right foot    Medial crossover toe deformity        Thorough discussion is had with the patient today, concerning the diagnosis, its etiology, and the treatment algorithm at present.    New x-rays where taken to show healing at the 1st TMTJ, proximal phalanx, and 2nd metatarsal neck.  No evidence of hardware complications.  Intermetatarsal angle improved from 21° to 8°.  Tibial sesamoid position 2/3.  Hallux abductus angle 5°.  Minor deviation of the 2nd digit of the from previous crossover toe deformity.    Continue with regular activity and ambulation  Continue with range of motion exercise of the 1st metatarsophalangeal joint  Consider compression socks for longer rides or when legs in dependent position    Continue to reinforce with the patient the importance of calling immediately if there is any noted complications or sudden changes to the patient's condition.  We discuss red flag risk factors of things to continue to monitor for.  Patient relates understanding as discussed    Future Appointments   Date Time Provider Department Center   2/10/2025  9:10 AM [facility]  BMD1 [facility] BMD [facility]   2/10/2025  9:40 AM [facility]  MAMMO1 SCREEN [facility] MAMMO [facility]   2/10/2025 10:40 AM Liseth Sampson MD [facility] OBGYN [facility]

## 2025-08-21 DIAGNOSIS — M79.671 RIGHT FOOT PAIN: Primary | ICD-10-CM

## 2025-08-26 ENCOUNTER — HOSPITAL ENCOUNTER (OUTPATIENT)
Dept: RADIOLOGY | Facility: HOSPITAL | Age: 65
Discharge: HOME | End: 2025-08-26
Attending: PODIATRIST
Payer: COMMERCIAL

## 2025-08-26 ENCOUNTER — OFFICE VISIT (OUTPATIENT)
Dept: PODIATRY | Facility: CLINIC | Age: 65
End: 2025-08-26
Payer: COMMERCIAL

## 2025-08-26 DIAGNOSIS — M79.671 RIGHT FOOT PAIN: Primary | ICD-10-CM

## 2025-08-26 DIAGNOSIS — S92.351A CLOSED DISPLACED FRACTURE OF FIFTH METATARSAL BONE OF RIGHT FOOT, INITIAL ENCOUNTER: ICD-10-CM

## 2025-08-26 DIAGNOSIS — M79.671 RIGHT FOOT PAIN: ICD-10-CM

## 2025-08-26 PROCEDURE — 99999 PR PBB SHADOW E&M-EST. PATIENT-LVL III: CPT | Mod: PBBFAC,,, | Performed by: PODIATRIST

## 2025-08-26 PROCEDURE — 1160F RVW MEDS BY RX/DR IN RCRD: CPT | Mod: CPTII,S$GLB,, | Performed by: PODIATRIST

## 2025-08-26 PROCEDURE — 1125F AMNT PAIN NOTED PAIN PRSNT: CPT | Mod: CPTII,S$GLB,, | Performed by: PODIATRIST

## 2025-08-26 PROCEDURE — 73630 X-RAY EXAM OF FOOT: CPT | Mod: TC,RT

## 2025-08-26 PROCEDURE — 73630 X-RAY EXAM OF FOOT: CPT | Mod: 26,RT,, | Performed by: RADIOLOGY

## 2025-08-26 PROCEDURE — 1159F MED LIST DOCD IN RCRD: CPT | Mod: CPTII,S$GLB,, | Performed by: PODIATRIST

## 2025-08-26 PROCEDURE — 3288F FALL RISK ASSESSMENT DOCD: CPT | Mod: CPTII,S$GLB,, | Performed by: PODIATRIST

## 2025-08-26 PROCEDURE — 4010F ACE/ARB THERAPY RXD/TAKEN: CPT | Mod: CPTII,S$GLB,, | Performed by: PODIATRIST

## 2025-08-26 PROCEDURE — 99214 OFFICE O/P EST MOD 30 MIN: CPT | Mod: S$GLB,,, | Performed by: PODIATRIST

## 2025-08-26 PROCEDURE — 1101F PT FALLS ASSESS-DOCD LE1/YR: CPT | Mod: CPTII,S$GLB,, | Performed by: PODIATRIST

## 2025-08-26 RX ORDER — ERGOCALCIFEROL 1.25 MG/1
50000 CAPSULE ORAL
Qty: 8 CAPSULE | Refills: 0 | Status: SHIPPED | OUTPATIENT
Start: 2025-08-26 | End: 2025-10-15

## (undated) DEVICE — SUT ETHILON 3-0 PS2 18 BLK

## (undated) DEVICE — COVER LIGHT HANDLE 80/CA

## (undated) DEVICE — BLADE SAW SAG CUT EDG 9X31MM

## (undated) DEVICE — PAD CAST SPECIALIST STRL 4

## (undated) DEVICE — UNDERGLOVES BIOGEL PI SIZE 8.5

## (undated) DEVICE — BLADE MEDIUM LONG 9MM X 31MM

## (undated) DEVICE — BANDAGE ROLL COTTN 4.5INX4.1YD

## (undated) DEVICE — KIT TURNOVER

## (undated) DEVICE — SUPPORT ULNA NERVE PROTECTOR

## (undated) DEVICE — SYR 10CC LUER LOCK

## (undated) DEVICE — SUT VICRYL CTD 2-0 GI 27 SH

## (undated) DEVICE — DRAPE MINI C-ARM

## (undated) DEVICE — COVER CAMERA OPERATING ROOM

## (undated) DEVICE — BANDAGE MATRIX HK LOOP 4IN 5YD

## (undated) DEVICE — BLADE SAW LAPIPLASTY 40X11MM

## (undated) DEVICE — APPLICATOR CHLORAPREP ORN 26ML

## (undated) DEVICE — MANIFOLD 4 PORT

## (undated) DEVICE — SPONGE COTTON TRAY 4X4IN

## (undated) DEVICE — PAD UNDERPAD 30X30

## (undated) DEVICE — SUT VICRYL 3-0 27 SH

## (undated) DEVICE — ELECTRODE REM PLYHSV RETURN 9

## (undated) DEVICE — PACK BASIC SETUP SC BR

## (undated) DEVICE — NDL SAFETY 22G X 1.5 ECLIPSE

## (undated) DEVICE — DRAPE THREE-QTR REINF 53X77IN

## (undated) DEVICE — BLADE SURG #15 CARBON STEEL

## (undated) DEVICE — GLOVE BIOGEL SZ 8 1/2

## (undated) DEVICE — BNDG COFLEX FOAM LF2 ST 3X5YD